# Patient Record
Sex: FEMALE | Race: WHITE | NOT HISPANIC OR LATINO | Employment: UNEMPLOYED | URBAN - METROPOLITAN AREA
[De-identification: names, ages, dates, MRNs, and addresses within clinical notes are randomized per-mention and may not be internally consistent; named-entity substitution may affect disease eponyms.]

---

## 2023-03-13 LAB
ALANINE AMINOTRANSFERASE (SGPT) (U/L) IN SER/PLAS: 19 U/L (ref 7–45)
ALBUMIN (G/DL) IN SER/PLAS: 4.2 G/DL (ref 3.4–5)
ALKALINE PHOSPHATASE (U/L) IN SER/PLAS: 35 U/L (ref 33–110)
ANION GAP IN SER/PLAS: 14 MMOL/L (ref 10–20)
ASPARTATE AMINOTRANSFERASE (SGOT) (U/L) IN SER/PLAS: 21 U/L (ref 9–39)
BILIRUBIN TOTAL (MG/DL) IN SER/PLAS: 0.4 MG/DL (ref 0–1.2)
CALCIUM (MG/DL) IN SER/PLAS: 8.9 MG/DL (ref 8.6–10.3)
CARBON DIOXIDE, TOTAL (MMOL/L) IN SER/PLAS: 27 MMOL/L (ref 21–32)
CHLORIDE (MMOL/L) IN SER/PLAS: 101 MMOL/L (ref 98–107)
CREATININE (MG/DL) IN SER/PLAS: 0.89 MG/DL (ref 0.5–1.05)
ERYTHROCYTE DISTRIBUTION WIDTH (RATIO) BY AUTOMATED COUNT: 14.1 % (ref 11.5–14.5)
ERYTHROCYTE MEAN CORPUSCULAR HEMOGLOBIN CONCENTRATION (G/DL) BY AUTOMATED: 32.9 G/DL (ref 32–36)
ERYTHROCYTE MEAN CORPUSCULAR VOLUME (FL) BY AUTOMATED COUNT: 99 FL (ref 80–100)
ERYTHROCYTES (10*6/UL) IN BLOOD BY AUTOMATED COUNT: 4.25 X10E12/L (ref 4–5.2)
GFR FEMALE: 79 ML/MIN/1.73M2
GLUCOSE (MG/DL) IN SER/PLAS: 71 MG/DL (ref 74–99)
HEMATOCRIT (%) IN BLOOD BY AUTOMATED COUNT: 41.9 % (ref 36–46)
HEMOGLOBIN (G/DL) IN BLOOD: 13.8 G/DL (ref 12–16)
LEUKOCYTES (10*3/UL) IN BLOOD BY AUTOMATED COUNT: 6.1 X10E9/L (ref 4.4–11.3)
PLATELETS (10*3/UL) IN BLOOD AUTOMATED COUNT: 194 X10E9/L (ref 150–450)
POTASSIUM (MMOL/L) IN SER/PLAS: 4.1 MMOL/L (ref 3.5–5.3)
PROTEIN TOTAL: 6.7 G/DL (ref 6.4–8.2)
SODIUM (MMOL/L) IN SER/PLAS: 138 MMOL/L (ref 136–145)
UREA NITROGEN (MG/DL) IN SER/PLAS: 19 MG/DL (ref 6–23)
VALPROIC ACID (UG/ML) IN SER/PLAS: 90 UG/ML (ref 50–100)

## 2023-03-14 LAB — LAMOTRIGINE LEVEL: 15.7 UG/ML (ref 2.5–15)

## 2023-09-18 ENCOUNTER — HOSPITAL ENCOUNTER (OUTPATIENT)
Dept: DATA CONVERSION | Facility: HOSPITAL | Age: 48
Discharge: HOME | End: 2023-09-18
Payer: COMMERCIAL

## 2023-09-18 DIAGNOSIS — Z01.419 ENCOUNTER FOR GYNECOLOGICAL EXAMINATION (GENERAL) (ROUTINE) WITHOUT ABNORMAL FINDINGS: ICD-10-CM

## 2023-09-18 DIAGNOSIS — Z11.51 ENCOUNTER FOR SCREENING FOR HUMAN PAPILLOMAVIRUS (HPV): ICD-10-CM

## 2023-10-13 ENCOUNTER — HOSPITAL ENCOUNTER (OUTPATIENT)
Dept: RADIOLOGY | Facility: HOSPITAL | Age: 48
Discharge: HOME | End: 2023-10-13
Payer: COMMERCIAL

## 2023-10-13 DIAGNOSIS — Z12.31 ENCOUNTER FOR SCREENING MAMMOGRAM FOR MALIGNANT NEOPLASM OF BREAST: ICD-10-CM

## 2023-10-13 PROCEDURE — 77063 BREAST TOMOSYNTHESIS BI: CPT | Performed by: RADIOLOGY

## 2023-10-13 PROCEDURE — 77063 BREAST TOMOSYNTHESIS BI: CPT

## 2023-10-13 PROCEDURE — 77067 SCR MAMMO BI INCL CAD: CPT | Performed by: RADIOLOGY

## 2024-03-01 DIAGNOSIS — G40.909 SEIZURE DISORDER (MULTI): Primary | ICD-10-CM

## 2024-03-01 RX ORDER — LAMOTRIGINE 200 MG/1
TABLET ORAL
Qty: 30 TABLET | Refills: 0 | Status: SHIPPED | OUTPATIENT
Start: 2024-03-01 | End: 2024-04-01

## 2024-03-15 DIAGNOSIS — G40.909 SEIZURE DISORDER (MULTI): Primary | ICD-10-CM

## 2024-03-15 RX ORDER — DIVALPROEX SODIUM 500 MG/1
500 TABLET, DELAYED RELEASE ORAL 2 TIMES DAILY
COMMUNITY
Start: 2013-01-25 | End: 2024-03-15 | Stop reason: SDUPTHER

## 2024-03-20 RX ORDER — DIVALPROEX SODIUM 500 MG/1
500 TABLET, DELAYED RELEASE ORAL 2 TIMES DAILY
Qty: 180 TABLET | Refills: 1 | Status: SHIPPED | OUTPATIENT
Start: 2024-03-20 | End: 2024-03-22 | Stop reason: WASHOUT

## 2024-03-22 ENCOUNTER — TELEPHONE (OUTPATIENT)
Dept: NEUROLOGY | Facility: CLINIC | Age: 49
End: 2024-03-22
Payer: COMMERCIAL

## 2024-03-22 DIAGNOSIS — G40.909 SEIZURE DISORDER (MULTI): Primary | ICD-10-CM

## 2024-03-22 RX ORDER — DIVALPROEX SODIUM 500 MG/1
500 TABLET, FILM COATED, EXTENDED RELEASE ORAL 2 TIMES DAILY
Qty: 60 TABLET | Refills: 11 | Status: SHIPPED | OUTPATIENT
Start: 2024-03-22 | End: 2025-03-22

## 2024-03-26 ENCOUNTER — OFFICE VISIT (OUTPATIENT)
Dept: NEUROLOGY | Facility: CLINIC | Age: 49
End: 2024-03-26
Payer: COMMERCIAL

## 2024-03-26 VITALS
SYSTOLIC BLOOD PRESSURE: 138 MMHG | HEIGHT: 63 IN | BODY MASS INDEX: 33.49 KG/M2 | HEART RATE: 78 BPM | WEIGHT: 189 LBS | DIASTOLIC BLOOD PRESSURE: 86 MMHG

## 2024-03-26 DIAGNOSIS — G40.409 OTHER GENERALIZED EPILEPSY AND EPILEPTIC SYNDROMES, NOT INTRACTABLE, WITHOUT STATUS EPILEPTICUS (MULTI): Primary | ICD-10-CM

## 2024-03-26 PROCEDURE — 1036F TOBACCO NON-USER: CPT | Performed by: PSYCHIATRY & NEUROLOGY

## 2024-03-26 PROCEDURE — 99212 OFFICE O/P EST SF 10 MIN: CPT | Performed by: PSYCHIATRY & NEUROLOGY

## 2024-03-26 RX ORDER — ASCORBIC ACID 500 MG
500 TABLET,CHEWABLE ORAL DAILY
COMMUNITY
Start: 2020-03-10

## 2024-03-26 RX ORDER — MULTIVITAMIN
1 TABLET ORAL DAILY
COMMUNITY
Start: 2022-04-01

## 2024-03-26 NOTE — PATIENT INSTRUCTIONS
You are doing well, so continue the current seizure medications, and have the blood work in [place].  We will inform you the results.  Follow up in one year, either virtual or in person.

## 2024-03-26 NOTE — PROGRESS NOTES
Subjective   Pretty Rojas is a 48 y.o.   female.  HPI  This is a 48 year old woman with generalized, non-convulsive epilepsy, last seen in 3/2023.  She has had controlled seizures, had no adverse effects other than tiredness.      Objective   Neurological Exam  Physical Exam  I personally reviewed laboratory, radiographic, and medical studies which were pertinent for nothing.    Assessment/Plan

## 2024-03-31 DIAGNOSIS — G40.909 SEIZURE DISORDER (MULTI): ICD-10-CM

## 2024-04-01 RX ORDER — LAMOTRIGINE 200 MG/1
TABLET ORAL
Qty: 30 TABLET | Refills: 0 | Status: SHIPPED | OUTPATIENT
Start: 2024-04-01 | End: 2024-04-23

## 2024-04-04 ENCOUNTER — LAB (OUTPATIENT)
Dept: LAB | Facility: LAB | Age: 49
End: 2024-04-04
Payer: COMMERCIAL

## 2024-04-04 DIAGNOSIS — G40.409 OTHER GENERALIZED EPILEPSY AND EPILEPTIC SYNDROMES, NOT INTRACTABLE, WITHOUT STATUS EPILEPTICUS (MULTI): ICD-10-CM

## 2024-04-04 LAB
ALBUMIN SERPL BCP-MCNC: 4.4 G/DL (ref 3.4–5)
ALP SERPL-CCNC: 41 U/L (ref 33–110)
ALT SERPL W P-5'-P-CCNC: 18 U/L (ref 7–45)
ANION GAP SERPL CALC-SCNC: 14 MMOL/L (ref 10–20)
AST SERPL W P-5'-P-CCNC: 18 U/L (ref 9–39)
BILIRUB SERPL-MCNC: 0.3 MG/DL (ref 0–1.2)
BUN SERPL-MCNC: 23 MG/DL (ref 6–23)
CALCIUM SERPL-MCNC: 9.5 MG/DL (ref 8.6–10.3)
CHLORIDE SERPL-SCNC: 102 MMOL/L (ref 98–107)
CO2 SERPL-SCNC: 27 MMOL/L (ref 21–32)
CREAT SERPL-MCNC: 0.89 MG/DL (ref 0.5–1.05)
EGFRCR SERPLBLD CKD-EPI 2021: 79 ML/MIN/1.73M*2
ERYTHROCYTE [DISTWIDTH] IN BLOOD BY AUTOMATED COUNT: 13.9 % (ref 11.5–14.5)
GLUCOSE SERPL-MCNC: 85 MG/DL (ref 74–99)
HCT VFR BLD AUTO: 40.9 % (ref 36–46)
HGB BLD-MCNC: 14 G/DL (ref 12–16)
LAMOTRIGINE SERPL-MCNC: 16.9 UG/ML (ref 2.5–15)
MCH RBC QN AUTO: 31.6 PG (ref 26–34)
MCHC RBC AUTO-ENTMCNC: 34.2 G/DL (ref 32–36)
MCV RBC AUTO: 92 FL (ref 80–100)
NRBC BLD-RTO: 0 /100 WBCS (ref 0–0)
PLATELET # BLD AUTO: 180 X10*3/UL (ref 150–450)
POTASSIUM SERPL-SCNC: 4.2 MMOL/L (ref 3.5–5.3)
PROT SERPL-MCNC: 7.1 G/DL (ref 6.4–8.2)
RBC # BLD AUTO: 4.43 X10*6/UL (ref 4–5.2)
SODIUM SERPL-SCNC: 139 MMOL/L (ref 136–145)
VALPROATE SERPL-MCNC: 83 UG/ML (ref 50–100)
WBC # BLD AUTO: 5.5 X10*3/UL (ref 4.4–11.3)

## 2024-04-04 PROCEDURE — 36415 COLL VENOUS BLD VENIPUNCTURE: CPT

## 2024-04-04 PROCEDURE — 80175 DRUG SCREEN QUAN LAMOTRIGINE: CPT

## 2024-04-04 NOTE — RESULT ENCOUNTER NOTE
Please tell the patient that her blood work was fine, the Depakote level was 83- this is a good level.

## 2024-04-23 DIAGNOSIS — G40.909 SEIZURE DISORDER (MULTI): ICD-10-CM

## 2024-04-23 RX ORDER — LAMOTRIGINE 200 MG/1
TABLET ORAL
Qty: 30 TABLET | Refills: 0 | Status: SHIPPED | OUTPATIENT
Start: 2024-04-23 | End: 2024-05-17

## 2024-05-17 DIAGNOSIS — G40.909 SEIZURE DISORDER (MULTI): ICD-10-CM

## 2024-05-17 RX ORDER — LAMOTRIGINE 200 MG/1
TABLET ORAL
Qty: 30 TABLET | Refills: 0 | Status: SHIPPED | OUTPATIENT
Start: 2024-05-17

## 2024-06-13 DIAGNOSIS — G40.909 SEIZURE DISORDER (MULTI): ICD-10-CM

## 2024-06-13 RX ORDER — LAMOTRIGINE 200 MG/1
TABLET ORAL
Qty: 30 TABLET | Refills: 0 | Status: SHIPPED | OUTPATIENT
Start: 2024-06-13

## 2024-07-06 DIAGNOSIS — G40.909 SEIZURE DISORDER (MULTI): ICD-10-CM

## 2024-07-08 RX ORDER — LAMOTRIGINE 200 MG/1
TABLET ORAL
Qty: 30 TABLET | Refills: 0 | Status: SHIPPED | OUTPATIENT
Start: 2024-07-08

## 2024-07-30 DIAGNOSIS — G40.909 SEIZURE DISORDER (MULTI): ICD-10-CM

## 2024-07-30 RX ORDER — LAMOTRIGINE 200 MG/1
TABLET ORAL
Qty: 30 TABLET | Refills: 0 | Status: SHIPPED | OUTPATIENT
Start: 2024-07-30

## 2024-08-25 DIAGNOSIS — G40.909 SEIZURE DISORDER (MULTI): ICD-10-CM

## 2024-08-26 RX ORDER — LAMOTRIGINE 200 MG/1
TABLET ORAL
Qty: 30 TABLET | Refills: 0 | Status: SHIPPED | OUTPATIENT
Start: 2024-08-26

## 2024-09-21 DIAGNOSIS — G40.909 SEIZURE DISORDER (MULTI): ICD-10-CM

## 2024-09-23 RX ORDER — LAMOTRIGINE 200 MG/1
TABLET ORAL
Qty: 30 TABLET | Refills: 0 | Status: SHIPPED | OUTPATIENT
Start: 2024-09-23

## 2024-10-02 ENCOUNTER — TELEPHONE (OUTPATIENT)
Dept: OBSTETRICS AND GYNECOLOGY | Facility: CLINIC | Age: 49
End: 2024-10-02
Payer: COMMERCIAL

## 2024-10-02 DIAGNOSIS — Z12.31 ENCOUNTER FOR SCREENING MAMMOGRAM FOR MALIGNANT NEOPLASM OF BREAST: ICD-10-CM

## 2024-10-16 ENCOUNTER — HOSPITAL ENCOUNTER (OUTPATIENT)
Dept: RADIOLOGY | Facility: HOSPITAL | Age: 49
Discharge: HOME | End: 2024-10-16
Payer: COMMERCIAL

## 2024-10-16 VITALS — BODY MASS INDEX: 31.89 KG/M2 | WEIGHT: 180 LBS | HEIGHT: 63 IN

## 2024-10-16 DIAGNOSIS — Z12.31 ENCOUNTER FOR SCREENING MAMMOGRAM FOR MALIGNANT NEOPLASM OF BREAST: ICD-10-CM

## 2024-10-16 PROCEDURE — 77067 SCR MAMMO BI INCL CAD: CPT | Performed by: RADIOLOGY

## 2024-10-16 PROCEDURE — 77063 BREAST TOMOSYNTHESIS BI: CPT | Performed by: RADIOLOGY

## 2024-10-16 PROCEDURE — 77063 BREAST TOMOSYNTHESIS BI: CPT

## 2024-10-21 DIAGNOSIS — G40.909 SEIZURE DISORDER (MULTI): ICD-10-CM

## 2024-10-21 RX ORDER — LAMOTRIGINE 200 MG/1
TABLET ORAL
Qty: 30 TABLET | Refills: 0 | Status: SHIPPED | OUTPATIENT
Start: 2024-10-21

## 2024-11-09 ENCOUNTER — HOSPITAL ENCOUNTER (OUTPATIENT)
Dept: RADIOLOGY | Facility: CLINIC | Age: 49
Discharge: HOME | End: 2024-11-09
Payer: COMMERCIAL

## 2024-11-09 ENCOUNTER — OFFICE VISIT (OUTPATIENT)
Dept: URGENT CARE | Facility: URGENT CARE | Age: 49
End: 2024-11-09
Payer: COMMERCIAL

## 2024-11-09 VITALS
RESPIRATION RATE: 18 BRPM | SYSTOLIC BLOOD PRESSURE: 128 MMHG | HEART RATE: 88 BPM | TEMPERATURE: 98.2 F | DIASTOLIC BLOOD PRESSURE: 84 MMHG | OXYGEN SATURATION: 97 %

## 2024-11-09 DIAGNOSIS — Z20.89 EXPOSURE TO PNEUMONIA: ICD-10-CM

## 2024-11-09 DIAGNOSIS — J18.9 PNEUMONIA OF RIGHT LOWER LOBE DUE TO INFECTIOUS ORGANISM: Primary | ICD-10-CM

## 2024-11-09 DIAGNOSIS — J40 BRONCHITIS: ICD-10-CM

## 2024-11-09 DIAGNOSIS — J01.00 ACUTE NON-RECURRENT MAXILLARY SINUSITIS: ICD-10-CM

## 2024-11-09 PROCEDURE — 71046 X-RAY EXAM CHEST 2 VIEWS: CPT

## 2024-11-09 PROCEDURE — 71046 X-RAY EXAM CHEST 2 VIEWS: CPT | Mod: FOREIGN READ | Performed by: RADIOLOGY

## 2024-11-09 RX ORDER — AZITHROMYCIN 250 MG/1
TABLET, FILM COATED ORAL
Qty: 6 TABLET | Refills: 0 | Status: SHIPPED | OUTPATIENT
Start: 2024-11-09

## 2024-11-09 RX ORDER — METHYLPREDNISOLONE 4 MG/1
TABLET ORAL
Qty: 21 TABLET | Refills: 0 | Status: SHIPPED | OUTPATIENT
Start: 2024-11-09 | End: 2024-11-16

## 2024-11-09 ASSESSMENT — ENCOUNTER SYMPTOMS: COUGH: 1

## 2024-11-09 NOTE — PATIENT INSTRUCTIONS
Acute Bronchitis with exposure to pneumonia- Chest xray reviewed. Start Zpack and Medrol dose elsy as directed, take with food and probiotic. Continue OTC robitussin with plenty of fluids, trial of flonase 1 spray each nostril twice a day x 3-7 days, zyrtec 10mg daily x 1 week. Advised ER if difficulty breathing or chest pain or dizziness.    Acute sinusitis- if symptoms not completely resolved after 10 days, return for another course of antibiotic.

## 2024-11-09 NOTE — PROGRESS NOTES
"Subjective   Patient ID: Pretty Rojas is a 49 y.o. female. They present today with a chief complaint of Cough (\"Terrible cough,\" no other sxs. Worse at night ).    History of Present Illness    Cough      Pt was exposed to pneumonia at home. Pt reports productive spasmatic cough not spitting out to see color x 1 week and worsening at night when lying down. Occasionally having difficulty catching breath due to spasmatic cough. No dyspnea or wheeze or vomiting. Suspected fever first day, none since. Nonsmoker. Pt tried otc robitussin with minimal relief.     Past Medical History  Allergies as of 2024 - Reviewed 2024   Allergen Reaction Noted    Hydantoins Unknown 06/10/2003    Phenytoin Other and Rash 2013    Sulfa (sulfonamide antibiotics) Rash 2024       (Not in a hospital admission)       Past Medical History:   Diagnosis Date    Personal history of other diseases of the nervous system and sense organs     History of sciatica       Past Surgical History:   Procedure Laterality Date     SECTION, CLASSIC  2013     Section        reports that she has never smoked. She has never used smokeless tobacco. She reports current alcohol use. She reports that she does not use drugs.    Review of Systems  Review of Systems   Respiratory:  Positive for cough.                                   Objective    Vitals:    24 1444   BP: 128/84   BP Location: Left arm   Patient Position: Sitting   BP Cuff Size: Adult long   Pulse: 88   Resp: 18   Temp: 36.8 °C (98.2 °F)   TempSrc: Oral   SpO2: 97%     No LMP recorded.    Physical Exam  Vitals and nursing note reviewed.   Constitutional:       Appearance: Normal appearance.      Comments: Pleasant white female   HENT:      Head: Normocephalic and atraumatic.      Right Ear: Tympanic membrane, ear canal and external ear normal.      Left Ear: Tympanic membrane, ear canal and external ear normal.      Ears:      Comments: Dull " TMs     Nose: Congestion and rhinorrhea present.      Mouth/Throat:      Mouth: Mucous membranes are moist.      Pharynx: Posterior oropharyngeal erythema present.   Eyes:      Extraocular Movements: Extraocular movements intact.      Conjunctiva/sclera: Conjunctivae normal.      Pupils: Pupils are equal, round, and reactive to light.   Cardiovascular:      Rate and Rhythm: Normal rate and regular rhythm.      Heart sounds: No murmur heard.  Pulmonary:      Effort: Pulmonary effort is normal.      Comments: Mildly diminished breath sounds diffusely, faint expiratory wheeze RUL  Musculoskeletal:         General: Normal range of motion.      Cervical back: Normal range of motion and neck supple.   Lymphadenopathy:      Cervical: No cervical adenopathy.   Skin:     General: Skin is warm and dry.   Neurological:      General: No focal deficit present.      Mental Status: She is alert and oriented to person, place, and time.         Procedures    Point of Care Test & Imaging Results from this visit  No results found for this visit on 11/09/24.   XR chest 2 views    Result Date: 11/9/2024  STUDY: Chest Radiographs;  11/09/2024 3:20 PM INDICATION: Acute productive cough, wheeze on right upper lobe. Exposed to pneumonia. COMPARISON: None available. ACCESSION NUMBER(S): [id number] ORDERING CLINICIAN: WINSTON MARTIN TECHNIQUE:  Frontal and lateral chest. FINDINGS: CARDIOMEDIASTINAL SILHOUETTE: Cardiomediastinal silhouette is normal in size and configuration.  LUNGS: Patchy consolidation in the right base.  ABDOMEN: No remarkable upper abdominal findings.  BONES: No acute osseous changes.    Patchy consolidation in the right compatible with pneumonia. Signed by Phani Amin MD     Diagnostic study results (if any) were reviewed by Winston Martin PA-C.    Assessment/Plan   Allergies, medications, history, and pertinent labs/EKGs/Imaging reviewed by Winston Martin PA-C.     Medical Decision Making  49  yo F presents with spasmatic cough occasionally productive x 7 days, worsening. Pt exposed to sick contacts at home with pneumonia. Reviewed vitals stable. Exam remarkable for well appearing female, mild nasal congestion, pharyngeal erythema, dull Tms, faint exp wheeze RUL without respiratory distress. Discussed treatment for acute right sided pneumonia with Zpack and Medrol dose elsy. Encouraged OTC expectorant with plenty of fluids. Advised ER if worsening breathing. Advised to return for another course of antibiotic if nasal congestion and cough not completely resolved in 10 days.    Orders and Diagnoses  Diagnoses and all orders for this visit:  Pneumonia of right lower lobe due to infectious organism  -     XR chest 2 views; Future  -     azithromycin (Zithromax) 250 mg tablet; Take 2 tabs day 1, 1 tab day 2-5  -     methylPREDNISolone (Medrol Dospak) 4 mg tablets; Follow schedule on package instructions  Acute non-recurrent maxillary sinusitis  Exposure to pneumonia  -     XR chest 2 views; Future      Medical Admin Record      Patient disposition: Home    Electronically signed by Prabha Martin PA-C  5:31 PM

## 2024-11-11 ENCOUNTER — APPOINTMENT (OUTPATIENT)
Dept: PRIMARY CARE | Facility: CLINIC | Age: 49
End: 2024-11-11
Payer: COMMERCIAL

## 2024-11-11 VITALS
HEIGHT: 63 IN | OXYGEN SATURATION: 99 % | SYSTOLIC BLOOD PRESSURE: 136 MMHG | BODY MASS INDEX: 32.99 KG/M2 | WEIGHT: 186.2 LBS | DIASTOLIC BLOOD PRESSURE: 84 MMHG | TEMPERATURE: 97.6 F | HEART RATE: 80 BPM

## 2024-11-11 DIAGNOSIS — E78.5 BORDERLINE HYPERLIPIDEMIA: ICD-10-CM

## 2024-11-11 DIAGNOSIS — G40.209 PARTIAL SYMPTOMATIC EPILEPSY WITH COMPLEX PARTIAL SEIZURES, NOT INTRACTABLE, WITHOUT STATUS EPILEPTICUS (MULTI): Primary | ICD-10-CM

## 2024-11-11 DIAGNOSIS — H61.21 IMPACTED CERUMEN OF RIGHT EAR: ICD-10-CM

## 2024-11-11 DIAGNOSIS — J18.9 PNEUMONIA OF RIGHT LOWER LOBE DUE TO INFECTIOUS ORGANISM: ICD-10-CM

## 2024-11-11 DIAGNOSIS — R53.83 OTHER FATIGUE: ICD-10-CM

## 2024-11-11 DIAGNOSIS — E55.9 VITAMIN D INSUFFICIENCY: ICD-10-CM

## 2024-11-11 PROBLEM — G40.909 EPILEPSY WITHOUT STATUS EPILEPTICUS, NOT INTRACTABLE: Status: ACTIVE | Noted: 2024-11-11

## 2024-11-11 PROCEDURE — 3008F BODY MASS INDEX DOCD: CPT | Performed by: PHYSICIAN ASSISTANT

## 2024-11-11 PROCEDURE — 99204 OFFICE O/P NEW MOD 45 MIN: CPT | Performed by: PHYSICIAN ASSISTANT

## 2024-11-11 PROCEDURE — 1036F TOBACCO NON-USER: CPT | Performed by: PHYSICIAN ASSISTANT

## 2024-11-11 ASSESSMENT — PATIENT HEALTH QUESTIONNAIRE - PHQ9
SUM OF ALL RESPONSES TO PHQ9 QUESTIONS 1 AND 2: 0
1. LITTLE INTEREST OR PLEASURE IN DOING THINGS: NOT AT ALL
2. FEELING DOWN, DEPRESSED OR HOPELESS: NOT AT ALL

## 2024-11-11 NOTE — PROGRESS NOTES
Subjective     HPI   Pretty Rojas is a 49 y.o. year old female patient with presenting to clinic with concern for   Chief Complaint   Patient presents with    New Patient Visit       Pretty presents to clinic to establish care. [place] under the care of Dr Diana DEVRIES.     Was recently seen in  with concern for PNA. On azithro & medrol dose pack  IMPRESSION:  Patchy consolidation in the right compatible with pneumonia.    Neuro- Seizures      Dr Loaiza  discussed Dr Wells also  - depakote ER 500mg every day  - lamictal 200mg at bedtime    GYN= Dr Tracie Quintanilla      BP  BP Readings from Last 5 Encounters:   24 128/84   24 138/86   23 132/88   21 138/86   03/10/20 136/90       Patient Active Problem List   Diagnosis    Epilepsy without status epilepticus, not intractable       Past Medical History:   Diagnosis Date    Personal history of other diseases of the nervous system and sense organs     History of sciatica      Past Surgical History:   Procedure Laterality Date     SECTION, CLASSIC  2013     Section      Family History   Family history unknown: Yes      Social History     Tobacco Use    Smoking status: Never    Smokeless tobacco: Never   Substance Use Topics    Alcohol use: Yes     Comment: occasionally        Current Outpatient Medications:     ascorbic acid (Strawberry C) 500 mg chewable tablet, Chew 1 tablet (500 mg) once daily., Disp: , Rfl:     azithromycin (Zithromax) 250 mg tablet, Take 2 tabs day 1, 1 tab day 2-5, Disp: 6 tablet, Rfl: 0    divalproex (Depakote ER) 500 mg 24 hr tablet, Take 1 tablet (500 mg) by mouth 2 times a day. Delayed release, Disp: 60 tablet, Rfl: 11    lamoTRIgine (LaMICtal) 200 mg tablet, TAKE 1 TABLET ONCE AT      BEDTIME, Disp: 30 tablet, Rfl: 0    methylPREDNISolone (Medrol Dospak) 4 mg tablets, Follow schedule on package instructions, Disp: 21 tablet, Rfl: 0    multivitamin tablet, Take 1 tablet by mouth once  "daily., Disp: , Rfl:     carbamide peroxide (Debrox) 6.5 % otic solution, Administer 5 drops into the right ear 2 times a day for 4 days., Disp: 15 mL, Rfl: 0     Review of Systems  Constitutional: Denies fever  HEENT: Denies ST, earache  CVS: Denies Chest pain  Pulmonary: Denies wheezing, SOB  GI: Denies N/V  : Denies dysuria  Musculoskeletal:  Denies myalgia  Neuro: Denies focal weakness or numbness.  Skin: Denies Rashes.  *Review of Systems is negative unless otherwise mentioned in HPI or ROS above.    Objective   Pulse 80   Temp 36.4 °C (97.6 °F)   Ht 1.6 m (5' 3\")   Wt 84.5 kg (186 lb 3.2 oz)   SpO2 99%   BMI 32.98 kg/m²  reviewed Body mass index is 32.98 kg/m².     Physical Exam  Constitutional: NAD.  Resting comfortably.  Head: Atraumatic, normocephalic.  ENT: Moist oral mucosa. Nasal mucosa wnl.  R EAC with cerumen impaction.   Cardiac: Regular rate & rhythm.   Pulmonary: Lungs clear bilat  GI: Soft, Nontender, nondistended.   Musculoskeletal: No peripheral edema.   Skin: No evidence of trauma. No rashes  Psych: Intact judgement and insight.      Appears to be healing well from PNA, not coughing, no wheezing. Finish Zpack    .Assessment/Plan   Problem List Items Addressed This Visit             ICD-10-CM    Epilepsy without status epilepticus, not intractable - Primary G40.909    Relevant Orders    Referral to Neurology     Other Visit Diagnoses         Codes    Pneumonia of right lower lobe due to infectious organism     J18.9    Impacted cerumen of right ear     H61.21    Relevant Medications    carbamide peroxide (Debrox) 6.5 % otic solution            "

## 2024-11-19 ENCOUNTER — LAB (OUTPATIENT)
Dept: LAB | Facility: LAB | Age: 49
End: 2024-11-19
Payer: COMMERCIAL

## 2024-11-19 DIAGNOSIS — E55.9 VITAMIN D INSUFFICIENCY: ICD-10-CM

## 2024-11-19 DIAGNOSIS — R53.83 OTHER FATIGUE: ICD-10-CM

## 2024-11-19 DIAGNOSIS — E78.5 BORDERLINE HYPERLIPIDEMIA: ICD-10-CM

## 2024-11-19 LAB
25(OH)D3 SERPL-MCNC: 46 NG/ML (ref 30–100)
CHOLEST SERPL-MCNC: 190 MG/DL (ref 0–199)
CHOLESTEROL/HDL RATIO: 3.6
HDLC SERPL-MCNC: 52.2 MG/DL
LDLC SERPL CALC-MCNC: 102 MG/DL
NON HDL CHOLESTEROL: 138 MG/DL (ref 0–149)
T4 FREE SERPL-MCNC: 0.91 NG/DL (ref 0.61–1.12)
TRIGL SERPL-MCNC: 180 MG/DL (ref 0–149)
TSH SERPL-ACNC: 4.27 MIU/L (ref 0.44–3.98)
VIT B12 SERPL-MCNC: 1049 PG/ML (ref 211–911)
VLDL: 36 MG/DL (ref 0–40)

## 2024-11-19 PROCEDURE — 36415 COLL VENOUS BLD VENIPUNCTURE: CPT

## 2024-11-19 PROCEDURE — 82306 VITAMIN D 25 HYDROXY: CPT

## 2024-11-19 PROCEDURE — 82607 VITAMIN B-12: CPT

## 2024-12-05 DIAGNOSIS — G40.909 SEIZURE DISORDER (MULTI): ICD-10-CM

## 2024-12-05 RX ORDER — LAMOTRIGINE 200 MG/1
TABLET ORAL
Qty: 30 TABLET | Refills: 0 | Status: SHIPPED | OUTPATIENT
Start: 2024-12-05

## 2025-01-18 DIAGNOSIS — G40.909 SEIZURE DISORDER (MULTI): ICD-10-CM

## 2025-01-20 RX ORDER — LAMOTRIGINE 200 MG/1
TABLET ORAL
Qty: 30 TABLET | Refills: 0 | Status: SHIPPED | OUTPATIENT
Start: 2025-01-20

## 2025-01-30 ENCOUNTER — APPOINTMENT (OUTPATIENT)
Dept: OBSTETRICS AND GYNECOLOGY | Facility: CLINIC | Age: 50
End: 2025-01-30
Payer: COMMERCIAL

## 2025-02-07 ENCOUNTER — OFFICE VISIT (OUTPATIENT)
Dept: OBSTETRICS AND GYNECOLOGY | Facility: CLINIC | Age: 50
End: 2025-02-07
Payer: COMMERCIAL

## 2025-02-07 VITALS
WEIGHT: 188.4 LBS | BODY MASS INDEX: 33.38 KG/M2 | HEART RATE: 91 BPM | OXYGEN SATURATION: 98 % | SYSTOLIC BLOOD PRESSURE: 145 MMHG | HEIGHT: 63 IN | DIASTOLIC BLOOD PRESSURE: 91 MMHG

## 2025-02-07 DIAGNOSIS — Z12.31 SCREENING MAMMOGRAM FOR BREAST CANCER: ICD-10-CM

## 2025-02-07 DIAGNOSIS — Z01.419 ENCOUNTER FOR ANNUAL ROUTINE GYNECOLOGICAL EXAMINATION: Primary | ICD-10-CM

## 2025-02-07 PROCEDURE — 99396 PREV VISIT EST AGE 40-64: CPT | Performed by: OBSTETRICS & GYNECOLOGY

## 2025-02-07 PROCEDURE — 1036F TOBACCO NON-USER: CPT | Performed by: OBSTETRICS & GYNECOLOGY

## 2025-02-07 PROCEDURE — 3008F BODY MASS INDEX DOCD: CPT | Performed by: OBSTETRICS & GYNECOLOGY

## 2025-02-07 ASSESSMENT — SOCIAL DETERMINANTS OF HEALTH (SDOH)
WITHIN THE LAST YEAR, HAVE YOU BEEN AFRAID OF YOUR PARTNER OR EX-PARTNER?: NO
WITHIN THE LAST YEAR, HAVE YOU BEEN KICKED, HIT, SLAPPED, OR OTHERWISE PHYSICALLY HURT BY YOUR PARTNER OR EX-PARTNER?: NO
WITHIN THE LAST YEAR, HAVE YOU BEEN HUMILIATED OR EMOTIONALLY ABUSED IN OTHER WAYS BY YOUR PARTNER OR EX-PARTNER?: NO
WITHIN THE LAST YEAR, HAVE TO BEEN RAPED OR FORCED TO HAVE ANY KIND OF SEXUAL ACTIVITY BY YOUR PARTNER OR EX-PARTNER?: NO

## 2025-02-07 ASSESSMENT — PATIENT HEALTH QUESTIONNAIRE - PHQ9
1. LITTLE INTEREST OR PLEASURE IN DOING THINGS: NOT AT ALL
2. FEELING DOWN, DEPRESSED OR HOPELESS: NOT AT ALL
SUM OF ALL RESPONSES TO PHQ9 QUESTIONS 1 & 2: 0

## 2025-02-07 ASSESSMENT — LIFESTYLE VARIABLES
HOW OFTEN DO YOU HAVE A DRINK CONTAINING ALCOHOL: MONTHLY OR LESS
HOW OFTEN DO YOU HAVE SIX OR MORE DRINKS ON ONE OCCASION: LESS THAN MONTHLY
AUDIT-C TOTAL SCORE: 2
HOW MANY STANDARD DRINKS CONTAINING ALCOHOL DO YOU HAVE ON A TYPICAL DAY: 1 OR 2
SKIP TO QUESTIONS 9-10: 0

## 2025-02-07 ASSESSMENT — PAIN SCALES - GENERAL: PAINLEVEL_OUTOF10: 0-NO PAIN

## 2025-02-07 ASSESSMENT — ENCOUNTER SYMPTOMS
OCCASIONAL FEELINGS OF UNSTEADINESS: 0
LOSS OF SENSATION IN FEET: 0
DEPRESSION: 0

## 2025-02-07 NOTE — PROGRESS NOTES
ANNUAL GYNECOLOGIC VISIT     Subjective   HPI:  Pretty Rojas is a 49 y.o. female  No LMP recorded. Patient is perimenopausal. here for annual exam.    Complaints:   low libido  Periods: none 2024  Abnormal bleeding:   no  Pain:    no    GYNH:   HRT: no  History of abnormal Pap smear:   no  History of abnormal mammogram:   no  Colon Screenin2024    Last Pap Smear:  Result Date Procedure Results Follow-ups Next Due   2023 CONVERTED GYN CYTOLOGY CONVERTED FINAL DIAGNOSIS:   SPECIMEN ADEQUACY:       Satisfactory for evaluation.           Endocervical transformation zone component present.         GENERAL CATEGORIZATION:       Negative for intraepithelial lesion or malignancy.            See interpretation-result.          ...  CONVERTED COMMENT: The Pap test is only a screening test for cervical cancer. As with all  screening tests, false-negative results can occur, emphasizing the need  for ongoing surveillance and clinical correlation. If there are any  questions about the results of this scre...  CONVERTED CLINICAL CYTOLOGY HISTORY: CLINICAL HISTORY:  Date of Last Menstrual Period: 23  Automatic HPV at Clinicians' Request    CONVERTED INTERPRETATION:   HPV High Risk Panel & Genotyping:    Analyte                       Analysis         HPV 16 (High Risk)               NEGATIVE    HPV 18 (High Risk)               NEGATIVE    *Other HPV (High Risk)          NEGATIVE    *Other high risk types include 31,...         Last Mammogram:  Results for orders placed during the hospital encounter of 10/16/24    BI mammo bilateral screening tomosynthesis    Narrative  Interpreted By:  Jorge Hernandez,  STUDY:  BI MAMMO BILATERAL SCREENING TOMOSYNTHESIS;  10/16/2024 10:38 am    ACCESSION NUMBER(S):  [id number]    ORDERING CLINICIAN:  YAN PAYNE    INDICATION:  Screening.    ,Z12.31 Encounter for screening mammogram for malignant neoplasm of  breast    COMPARISON:  10/13/2023 screening  and 2021 diagnostic bilateral mammograms  with tomosynthesis.    FINDINGS:  2D and tomosynthesis images were reviewed at 1 mm slice thickness.    Density:  The breasts are heterogeneously dense, which may obscure  small masses.    Screening ultrasound or fast screening MRI as a complement to this  study might be considered. No suspicious masses or calcifications are  identified.    Impression  No mammographic evidence of malignancy.    BI-RADS CATEGORY:  BI-RADS Category:  1 Negative.  Recommendation:  Annual Screening.  Recommended Date:  1 Year.  Laterality:  Bilateral.    For any future breast imaging appointments, please call [phone]).    MACRO:  None    Signed by: Jorge Hernandez 10/18/2024 4:39 PM  Dictation workstation:   [id number]      Last DEXA:      OB History          2    Para   2    Term   2            AB        Living   2         SAB        IAB        Ectopic        Multiple        Live Births   2                  Past Medical History:   Diagnosis Date    Personal history of other diseases of the nervous system and sense organs     History of sciatica    Seizure disorder (Multi)        Past Surgical History:   Procedure Laterality Date     SECTION, CLASSIC  2013     Section       Prior to Admission medications    Medication Sig Start Date End Date Taking? Authorizing Provider   ascorbic acid (Strawberry C) 500 mg chewable tablet Chew 1 tablet (500 mg) once daily. 3/10/20   Historical Provider, MD   azithromycin (Zithromax) 250 mg tablet Take 2 tabs day 1, 1 tab day 2-5 24   Prabha Martin PA-C   divalproex (Depakote ER) 500 mg 24 hr tablet Take 1 tablet (500 mg) by mouth 2 times a day. Delayed release 3/22/24 3/22/25  Justice Loaiza MD   lamoTRIgine (LaMICtal) 200 mg tablet TAKE 1 TABLET ONCE AT      BEDTIME 25   Justice Loaiza MD   multivitamin tablet Take 1 tablet by mouth once daily. 22   Historical Provider, MD  "      Social History     Tobacco Use    Smoking status: Never    Smokeless tobacco: Never   Vaping Use    Vaping status: Never Used   Substance Use Topics    Alcohol use: Yes     Comment: occasionally    Drug use: Never        Objective   BP (!) 145/91   Pulse 91   Ht 1.6 m (5' 3\")   Wt 85.5 kg (188 lb 6.4 oz)   SpO2 98%   BMI 33.37 kg/m²        General:   Alert and oriented, in no acute distress   Neck: Supple. No visible thyromegaly.    Breast/Axilla: Normal to palpation bilaterally without masses, skin changes, or nipple discharge.    Abdomen: Soft, non-tender, without masses or organomegaly   Vulva: Normal architecture without erythema, masses, or lesions.    Vagina: Normal mucosa without lesions, masses.  Positive atrophy. No abnormal vaginal discharge.    Cervix: Normal without masses, lesions, or signs of cervicitis.    Uterus: Normal mobile, non-enlarged uterus    Adnexa: No palpable masses or tenderness   Pelvic Floor No POP noted. No high tone pelvic floor    Psych  Rectal Normal affect. Normal mood.   deferred     Assessment/Plan   Problem List Items Addressed This Visit    None  Visit Diagnoses         Codes    Encounter for annual routine gynecological examination    -  Primary Z01.419    Screening mammogram for breast cancer     Z12.31    Relevant Orders    BI mammo bilateral screening tomosynthesis        Options dw pt for low libido, let us know if desires further eval    Routine annual  OB/GYN Preventive:     - Pap smear indicated every 3-5 years if normal and otherwise low risk   - Self breast exam monthly and clinical breast examination/mammogram yearly   - Screening colonoscopy recommended starting age 45, then Q3-10 years depending on testing and family history   - Osteoporosis prevention discussion included vit D3/calcium supplements, weight-bearing exercise, DEXA starting at age 65 or sooner if risk factors.  - Genitourinary skin hygiene discussed.  - Diet/Weight management discussed.  - " Exercise 30-60 minutes 3-5 times/day  -Patient to return in 1 year for annual GYN visit or sooner as clinically indicated.    Tracie Quintanilla MD

## 2025-02-17 DIAGNOSIS — G40.909 SEIZURE DISORDER (MULTI): ICD-10-CM

## 2025-02-17 RX ORDER — LAMOTRIGINE 200 MG/1
TABLET ORAL
Qty: 30 TABLET | Refills: 0 | Status: SHIPPED | OUTPATIENT
Start: 2025-02-17

## 2025-03-19 DIAGNOSIS — G40.909 SEIZURE DISORDER (MULTI): ICD-10-CM

## 2025-03-19 RX ORDER — LAMOTRIGINE 200 MG/1
TABLET ORAL
Qty: 30 TABLET | Refills: 0 | Status: SHIPPED | OUTPATIENT
Start: 2025-03-19

## 2025-04-15 ENCOUNTER — APPOINTMENT (OUTPATIENT)
Dept: NEUROLOGY | Facility: CLINIC | Age: 50
End: 2025-04-15
Payer: COMMERCIAL

## 2025-04-15 DIAGNOSIS — G40.909 SEIZURE DISORDER (MULTI): ICD-10-CM

## 2025-04-15 PROCEDURE — 99213 OFFICE O/P EST LOW 20 MIN: CPT | Performed by: PSYCHIATRY & NEUROLOGY

## 2025-04-15 RX ORDER — LAMOTRIGINE 200 MG/1
200 TABLET ORAL EVERY EVENING
Qty: 90 TABLET | Refills: 3 | Status: SHIPPED | OUTPATIENT
Start: 2025-04-15 | End: 2026-04-15

## 2025-04-15 RX ORDER — DIVALPROEX SODIUM 500 MG/1
500 TABLET, FILM COATED, EXTENDED RELEASE ORAL 2 TIMES DAILY
Qty: 180 TABLET | Refills: 3 | Status: SHIPPED | OUTPATIENT
Start: 2025-04-15 | End: 2026-04-15

## 2025-04-15 NOTE — PATIENT INSTRUCTIONS
I stated to the patient in a virtual appointment that she was doing well, only her intermittent tremor is due to the Depakote, but she states that the Depakote is controlling her seizures, and she wants to stay on it.  I recommended basic blood work, such as CBC and CMP if not done in the last year.  I will refill her AEDs for one year- 90 day prescriptions, with 3 refills, follow up in one year- can be a virtual appointment.

## 2025-04-15 NOTE — PROGRESS NOTES
Subjective   Pretty Rojas is a 49 y.o.   female.  HPI  This is a virtual, audio-visual appointment.  This is a 49 year old woman with non-convulsive generalized seizures, last seen in 3/24.  She denies any breakthrough seizures.  She states she has intermittent action tremor.  She denies any other possible adverse effects from her two AEDs.  She denies any medication adverse effects, such as sedation.  Objective   Neurological Exam  Alert and pleasant woman.  Normal speech, interactive.  Physical Exam  I personally reviewed laboratory, radiographic, and medical studies which were pertinent for nothing.    Assessment/Plan

## 2025-05-02 LAB
ALBUMIN SERPL-MCNC: 4.6 G/DL (ref 3.6–5.1)
ALP SERPL-CCNC: 48 U/L (ref 37–153)
ALT SERPL-CCNC: 14 U/L (ref 6–29)
ANION GAP SERPL CALCULATED.4IONS-SCNC: 9 MMOL/L (CALC) (ref 7–17)
AST SERPL-CCNC: 17 U/L (ref 10–35)
BASOPHILS # BLD AUTO: 40 CELLS/UL (ref 0–200)
BASOPHILS NFR BLD AUTO: 0.8 %
BILIRUB SERPL-MCNC: 0.4 MG/DL (ref 0.2–1.2)
BUN SERPL-MCNC: 22 MG/DL (ref 7–25)
CALCIUM SERPL-MCNC: 9.2 MG/DL (ref 8.6–10.4)
CHLORIDE SERPL-SCNC: 102 MMOL/L (ref 98–110)
CO2 SERPL-SCNC: 28 MMOL/L (ref 20–32)
CREAT SERPL-MCNC: 0.88 MG/DL (ref 0.5–1.03)
EGFRCR SERPLBLD CKD-EPI 2021: 80 ML/MIN/1.73M2
EOSINOPHIL # BLD AUTO: 150 CELLS/UL (ref 15–500)
EOSINOPHIL NFR BLD AUTO: 3 %
ERYTHROCYTE [DISTWIDTH] IN BLOOD BY AUTOMATED COUNT: 14.3 % (ref 11–15)
GLUCOSE SERPL-MCNC: 93 MG/DL (ref 65–99)
HCT VFR BLD AUTO: 42.1 % (ref 35–45)
HGB BLD-MCNC: 14.2 G/DL (ref 11.7–15.5)
LYMPHOCYTES # BLD AUTO: 1920 CELLS/UL (ref 850–3900)
LYMPHOCYTES NFR BLD AUTO: 38.4 %
MCH RBC QN AUTO: 31.3 PG (ref 27–33)
MCHC RBC AUTO-ENTMCNC: 33.7 G/DL (ref 32–36)
MCV RBC AUTO: 92.9 FL (ref 80–100)
MONOCYTES # BLD AUTO: 580 CELLS/UL (ref 200–950)
MONOCYTES NFR BLD AUTO: 11.6 %
NEUTROPHILS # BLD AUTO: 2310 CELLS/UL (ref 1500–7800)
NEUTROPHILS NFR BLD AUTO: 46.2 %
PLATELET # BLD AUTO: 176 THOUSAND/UL (ref 140–400)
PMV BLD REES-ECKER: 10.1 FL (ref 7.5–12.5)
POTASSIUM SERPL-SCNC: 4.6 MMOL/L (ref 3.5–5.3)
PROT SERPL-MCNC: 7 G/DL (ref 6.1–8.1)
RBC # BLD AUTO: 4.53 MILLION/UL (ref 3.8–5.1)
SODIUM SERPL-SCNC: 139 MMOL/L (ref 135–146)
WBC # BLD AUTO: 5 THOUSAND/UL (ref 3.8–10.8)

## 2025-05-12 ENCOUNTER — APPOINTMENT (OUTPATIENT)
Dept: PRIMARY CARE | Facility: CLINIC | Age: 50
End: 2025-05-12
Payer: COMMERCIAL

## 2025-05-12 VITALS
OXYGEN SATURATION: 100 % | SYSTOLIC BLOOD PRESSURE: 134 MMHG | HEIGHT: 63 IN | WEIGHT: 184.6 LBS | HEART RATE: 85 BPM | TEMPERATURE: 97.4 F | BODY MASS INDEX: 32.71 KG/M2 | DIASTOLIC BLOOD PRESSURE: 78 MMHG

## 2025-05-12 DIAGNOSIS — Z78.0 ASYMPTOMATIC POSTMENOPAUSAL STATE: Primary | ICD-10-CM

## 2025-05-12 PROCEDURE — 1036F TOBACCO NON-USER: CPT | Performed by: PHYSICIAN ASSISTANT

## 2025-05-12 PROCEDURE — 3008F BODY MASS INDEX DOCD: CPT | Performed by: PHYSICIAN ASSISTANT

## 2025-05-12 PROCEDURE — 99213 OFFICE O/P EST LOW 20 MIN: CPT | Performed by: PHYSICIAN ASSISTANT

## 2025-05-12 RX ORDER — PSYLLIUM HUSK 0.4 G
2 CAPSULE ORAL DAILY
Qty: 180 TABLET | Refills: 3 | Status: SHIPPED | OUTPATIENT
Start: 2025-05-12 | End: 2026-05-12

## 2025-05-12 RX ORDER — CHOLECALCIFEROL (VITAMIN D3) 25 MCG
50 TABLET ORAL DAILY
COMMUNITY

## 2025-05-12 ASSESSMENT — PATIENT HEALTH QUESTIONNAIRE - PHQ9
1. LITTLE INTEREST OR PLEASURE IN DOING THINGS: NOT AT ALL
SUM OF ALL RESPONSES TO PHQ9 QUESTIONS 1 AND 2: 0
2. FEELING DOWN, DEPRESSED OR HOPELESS: NOT AT ALL

## 2025-05-12 NOTE — PROGRESS NOTES
"Subjective     HPI   Pretty Rojas is a 49 y.o. year old female patient with presenting to clinic with concern for   Chief Complaint   Patient presents with    Follow-up     6 months         BP Readings from Last 5 Encounters:   05/12/25 134/78   02/07/25 (!) 145/91   11/11/24 136/84   11/09/24 128/84   03/26/24 138/86     Neuro- Seizures              Dr Loaiza  discussed Dr Wells also  - depakote ER 500mg every day  - lamictal 200mg at bedtime     GYN  Dr Tracie Quintanilla   postmenopausal as of 4/2025    Vitamins  Vitamin D3    2000units daily    Calcium supplements    Vickey seeds for fiber    Problem List[1]    Medical History[2]   Surgical History[3]   Family History[4]   Social History     Tobacco Use    Smoking status: Never    Smokeless tobacco: Never   Substance Use Topics    Alcohol use: Yes     Comment: occasionally      Current Medications[5]     Review of Systems  Constitutional: Denies fever  HEENT: Denies ST, earache  CVS: Denies Chest pain  Pulmonary: Denies wheezing, SOB  GI: Denies N/V  : Denies dysuria  Musculoskeletal:  Denies myalgia  Neuro: Denies focal weakness or numbness.  Skin: Denies Rashes.  *Review of Systems is negative unless otherwise mentioned in HPI or ROS above.    Objective   /78   Pulse 85   Temp 36.3 °C (97.4 °F)   Ht 1.6 m (5' 3\")   Wt 83.7 kg (184 lb 9.6 oz)   SpO2 100%   BMI 32.70 kg/m²  reviewed Body mass index is 32.7 kg/m².     Physical Exam  Constitutional: NAD.  Resting comfortably.  Head: Atraumatic, normocephalic.  ENT: Moist oral mucosa. Nasal mucosa wnl.   Cardiac: Regular rate & rhythm.   Pulmonary: Lungs clear bilat  GI: Soft, Nontender, nondistended.   Musculoskeletal: No peripheral edema.   Skin: No evidence of trauma. No rashes  Psych: Intact judgement and insight.    .Assessment/Plan            [1]   Patient Active Problem List  Diagnosis    Seizure disorder (Multi)   [2]   Past Medical History:  Diagnosis Date    Personal history of other " diseases of the nervous system and sense organs     History of sciatica    Seizure disorder (Multi)    [3]   Past Surgical History:  Procedure Laterality Date     SECTION, CLASSIC  2013     Section   [4]   Family History  Family history unknown: Yes   [5]   Current Outpatient Medications:     cholecalciferol (Vitamin D3) 25 mcg (1,000 units) tablet, Take 2 tablets (50 mcg) by mouth once daily., Disp: , Rfl:     divalproex (Depakote ER) 500 mg 24 hr tablet, Take 1 tablet (500 mg) by mouth 2 times a day. Delayed release, Disp: 180 tablet, Rfl: 3    lamoTRIgine (LaMICtal) 200 mg tablet, Take 1 tablet (200 mg) by mouth once daily in the evening., Disp: 90 tablet, Rfl: 3    calcium carbonate-vitamin D3 500 mg-5 mcg (200 unit) tablet, Take 2 tablets by mouth once daily., Disp: 180 tablet, Rfl: 3

## 2025-11-10 ENCOUNTER — APPOINTMENT (OUTPATIENT)
Dept: PRIMARY CARE | Facility: CLINIC | Age: 50
End: 2025-11-10
Payer: COMMERCIAL